# Patient Record
Sex: FEMALE | Race: BLACK OR AFRICAN AMERICAN
[De-identification: names, ages, dates, MRNs, and addresses within clinical notes are randomized per-mention and may not be internally consistent; named-entity substitution may affect disease eponyms.]

---

## 2020-01-25 ENCOUNTER — HOSPITAL ENCOUNTER (EMERGENCY)
Dept: HOSPITAL 84 - D.ER | Age: 65
Discharge: HOME | End: 2020-01-25
Payer: MEDICAID

## 2020-01-25 VITALS — HEIGHT: 62 IN | WEIGHT: 180.38 LBS | BODY MASS INDEX: 33.19 KG/M2

## 2020-01-25 VITALS — SYSTOLIC BLOOD PRESSURE: 127 MMHG | DIASTOLIC BLOOD PRESSURE: 72 MMHG

## 2020-01-25 DIAGNOSIS — G43.909: Primary | ICD-10-CM

## 2020-01-25 DIAGNOSIS — I10: ICD-10-CM

## 2020-01-28 ENCOUNTER — HOSPITAL ENCOUNTER (OUTPATIENT)
Dept: HOSPITAL 84 - D.M2 | Age: 65
Setting detail: OBSERVATION
LOS: 1 days | Discharge: HOME | End: 2020-01-29
Attending: INTERNAL MEDICINE | Admitting: INTERNAL MEDICINE
Payer: COMMERCIAL

## 2020-01-28 VITALS — SYSTOLIC BLOOD PRESSURE: 127 MMHG | DIASTOLIC BLOOD PRESSURE: 61 MMHG

## 2020-01-28 VITALS — HEIGHT: 62 IN | BODY MASS INDEX: 34.14 KG/M2 | WEIGHT: 185.49 LBS

## 2020-01-28 VITALS — DIASTOLIC BLOOD PRESSURE: 47 MMHG | SYSTOLIC BLOOD PRESSURE: 101 MMHG

## 2020-01-28 DIAGNOSIS — R07.89: Primary | ICD-10-CM

## 2020-01-28 DIAGNOSIS — R06.02: ICD-10-CM

## 2020-01-28 DIAGNOSIS — I10: ICD-10-CM

## 2020-01-28 LAB
ALBUMIN SERPL-MCNC: 3.2 G/DL (ref 3.4–5)
ALP SERPL-CCNC: 71 U/L (ref 30–120)
ALT SERPL-CCNC: 49 U/L (ref 10–68)
ANION GAP SERPL CALC-SCNC: 8.3 MMOL/L (ref 8–16)
APTT BLD: 21.8 SECONDS (ref 22.8–39.4)
BASOPHILS NFR BLD AUTO: 0.6 % (ref 0–2)
BILIRUB SERPL-MCNC: 0.27 MG/DL (ref 0.2–1.3)
BUN SERPL-MCNC: 39 MG/DL (ref 7–18)
CALCIUM SERPL-MCNC: 8.9 MG/DL (ref 8.5–10.1)
CHLORIDE SERPL-SCNC: 102 MMOL/L (ref 98–107)
CK MB SERPL-MCNC: 1 U/L (ref 0–3.6)
CK MB SERPL-MCNC: 1.6 U/L (ref 0–3.6)
CK SERPL-CCNC: 169 UL (ref 21–215)
CK SERPL-CCNC: 198 UL (ref 21–215)
CO2 SERPL-SCNC: 31.7 MMOL/L (ref 21–32)
CREAT SERPL-MCNC: 2 MG/DL (ref 0.6–1.3)
EOSINOPHIL NFR BLD: 0.6 % (ref 0–7)
ERYTHROCYTE [DISTWIDTH] IN BLOOD BY AUTOMATED COUNT: 14 % (ref 11.5–14.5)
GLOBULIN SER-MCNC: 4 G/L
GLUCOSE SERPL-MCNC: 98 MG/DL (ref 74–106)
HCT VFR BLD CALC: 37.9 % (ref 36–48)
HGB BLD-MCNC: 12.2 G/DL (ref 12–16)
IMM GRANULOCYTES NFR BLD: 0.3 % (ref 0–5)
INR PPP: 0.95 (ref 0.85–1.17)
LYMPHOCYTES NFR BLD AUTO: 24.7 % (ref 15–50)
MAGNESIUM SERPL-MCNC: 1.9 MG/DL (ref 1.8–2.4)
MCH RBC QN AUTO: 26.2 PG (ref 26–34)
MCHC RBC AUTO-ENTMCNC: 32.2 G/DL (ref 31–37)
MCV RBC: 81.5 FL (ref 80–100)
MONOCYTES NFR BLD: 13 % (ref 2–11)
NEUTROPHILS NFR BLD AUTO: 60.8 % (ref 40–80)
OSMOLALITY SERPL CALC.SUM OF ELEC: 284 MOSM/KG (ref 275–300)
PLATELET # BLD: 352 10X3/UL (ref 130–400)
PMV BLD AUTO: 10.2 FL (ref 7.4–10.4)
POTASSIUM SERPL-SCNC: 4 MMOL/L (ref 3.5–5.1)
PROT SERPL-MCNC: 7.2 G/DL (ref 6.4–8.2)
PROTHROMBIN TIME: 12.6 SECONDS (ref 11.6–15)
RBC # BLD AUTO: 4.65 10X6/UL (ref 4–5.4)
SODIUM SERPL-SCNC: 138 MMOL/L (ref 136–145)
TROPONIN I SERPL-MCNC: < 0.017 NG/ML (ref 0–0.06)
TROPONIN I SERPL-MCNC: < 0.017 NG/ML (ref 0–0.06)
WBC # BLD AUTO: 7.1 10X3/UL (ref 4.8–10.8)

## 2020-01-28 NOTE — NUR
RECEIVED BEDSIDE REPORT. PATIENT IS ALERT AND ORIENTED, RESTING COMFORTABLY IN
BED. RESPIRATIONS ARE EVEN AND UNLABORED. NO S/S OF DISTRESS. NO C/O PAIN.
CALL LIGHT WITHIN REACH. WILL CPOC.

## 2020-01-29 VITALS — DIASTOLIC BLOOD PRESSURE: 87 MMHG | SYSTOLIC BLOOD PRESSURE: 130 MMHG

## 2020-01-29 VITALS — SYSTOLIC BLOOD PRESSURE: 144 MMHG | DIASTOLIC BLOOD PRESSURE: 79 MMHG

## 2020-01-29 VITALS — SYSTOLIC BLOOD PRESSURE: 132 MMHG | DIASTOLIC BLOOD PRESSURE: 70 MMHG

## 2020-01-29 LAB
ALBUMIN SERPL-MCNC: 2.9 G/DL (ref 3.4–5)
ALP SERPL-CCNC: 70 U/L (ref 30–120)
ALT SERPL-CCNC: 45 U/L (ref 10–68)
ANION GAP SERPL CALC-SCNC: 10 MMOL/L (ref 8–16)
BASOPHILS NFR BLD AUTO: 0.5 % (ref 0–2)
BILIRUB SERPL-MCNC: 0.41 MG/DL (ref 0.2–1.3)
BUN SERPL-MCNC: 26 MG/DL (ref 7–18)
CALCIUM SERPL-MCNC: 8.6 MG/DL (ref 8.5–10.1)
CHLORIDE SERPL-SCNC: 100 MMOL/L (ref 98–107)
CK MB SERPL-MCNC: 0.5 U/L (ref 0–3.6)
CK MB SERPL-MCNC: 0.7 U/L (ref 0–3.6)
CK SERPL-CCNC: 114 UL (ref 21–215)
CK SERPL-CCNC: 139 UL (ref 21–215)
CO2 SERPL-SCNC: 30.7 MMOL/L (ref 21–32)
CREAT SERPL-MCNC: 1.5 MG/DL (ref 0.6–1.3)
EOSINOPHIL NFR BLD: 0.3 % (ref 0–7)
ERYTHROCYTE [DISTWIDTH] IN BLOOD BY AUTOMATED COUNT: 13.6 % (ref 11.5–14.5)
GLOBULIN SER-MCNC: 4.2 G/L
GLUCOSE SERPL-MCNC: 108 MG/DL (ref 74–106)
HCT VFR BLD CALC: 37 % (ref 36–48)
HGB BLD-MCNC: 11.9 G/DL (ref 12–16)
IMM GRANULOCYTES NFR BLD: 0.2 % (ref 0–5)
LYMPHOCYTES NFR BLD AUTO: 21.6 % (ref 15–50)
MCH RBC QN AUTO: 25.9 PG (ref 26–34)
MCHC RBC AUTO-ENTMCNC: 32.2 G/DL (ref 31–37)
MCV RBC: 80.6 FL (ref 80–100)
MONOCYTES NFR BLD: 13.8 % (ref 2–11)
NEUTROPHILS NFR BLD AUTO: 63.6 % (ref 40–80)
OSMOLALITY SERPL CALC.SUM OF ELEC: 279 MOSM/KG (ref 275–300)
PLATELET # BLD: 340 10X3/UL (ref 130–400)
PMV BLD AUTO: 10.3 FL (ref 7.4–10.4)
POTASSIUM SERPL-SCNC: 3.7 MMOL/L (ref 3.5–5.1)
PROT SERPL-MCNC: 7.1 G/DL (ref 6.4–8.2)
RBC # BLD AUTO: 4.59 10X6/UL (ref 4–5.4)
SODIUM SERPL-SCNC: 137 MMOL/L (ref 136–145)
TROPONIN I SERPL-MCNC: < 0.017 NG/ML (ref 0–0.06)
TROPONIN I SERPL-MCNC: < 0.017 NG/ML (ref 0–0.06)
WBC # BLD AUTO: 6.3 10X3/UL (ref 4.8–10.8)

## 2020-01-29 NOTE — HP
PATIENT: PING AMADO                                    MEDICAL RECORD: [id number]
ACCOUNT: [id number]                                    LOCATION:[address]
: 51                                            ADMISSION DATE: 20
                                                         PCP: SHEELA REGALADO MD        
 
                             HISTORY AND PHYSICAL EXAMINATION
 
 
DIAGNOSES:
1.  Chest pain compatible with angina.
2.  Shortness of breath.
3.  Hypertension.
 
HISTORY OF PRESENT ILLNESS:  Mrs. Amado has no history of ischemic heart
disease.  She began having chest pain yesterday.  She has had multiple episodes
of recurrent chest pain, it is a very typical anginal pain, a pressure, dull
sensation across the anterior chest, radiating to her left arm and her left jaw.
 She has had multiple episodes of that today.  She could not finish her work due
to increasing severity and increasing episodes of that.  She is currently pain
free.  Her EKG is with no ST-T changes.  She has no history of ischemic heart
disease.
 
PHYSICAL EXAMINATION:
CONSTITUTIONAL/GENERAL APPEARANCE:  Well nourished, well developed, appears
stated age.
EYES:  Lids and conjunctivae noninjected.  No discharge.  No pallor.
ENT:  Lips within normal limit.  No cyanosis. No pallor.
NECK:  Carotid arteries, bilateral normal upstroke.  No bruits. No thrills.  No
jugular venous pressure or distention.
CERVICAL LYMPH NODES:  Nontender.  Nonenlarged.
THYROID:  Not enlarged.  No nodules.
CARDIOVASCULAR:  Precordial exam, nondisplaced.  No heaves or pericardial
thrills.  Rate and rhythm, regular.  Heart sounds, normal S1, normal S2.   No
S3, no gallop, no rub.  Systolic murmur, not heard.  Diastolic murmur, not
heard.
RESPIRATORY:  Respiratory effort, unlabored.   Normal curvature.  No thoracic
deformity.  No chest wall tenderness.  Percussion, resonant.  Auscultation,
clear.  No wheezes, no rales, no rhonchi.
ABDOMEN:  Soft, nondistended, nontender.  No abdominal pain, no vomiting and
normal appetite.
MUSCULOSKELETAL:  No joint tenderness, normal gait, normal tone.
SKIN:  Warm and dry.
 
OVERALL IMPRESSION:  Chest pain compatible with angina, normal EKG, normal
troponin.  We will risk stratify with stress testing Cardiolite imaging. 
Further care depends on findings of stress test.
 
TRANSINT:[id number] Voice Confirmation ID: [id number] DOCUMENT ID: [id number]
 
 
 
 
 
HISTORY AND PHYSICAL                           [id number]    PING AMADO, LATASHA GONZALEZ             
 
 
 
Electronically Signed by LATASHA TINAJERO on 20 at 1228
 
 
 
 
 
 
 
 
 
 
 
 
 
 
 
 
 
 
 
 
 
 
 
 
 
 
 
 
 
 
 
 
 
 
 
 
 
 
 
 
 
CC:                                                             [id number]
DICTATION DATE: 20 1300     :     20 1338      [facility]                                          
[address] [place] [address]

## 2020-01-30 NOTE — ST
PATIENT:PING MORALES                              MEDICAL RECORD: [id number]
SEX: F                                                   LOCATION:[facility]          
ORDER #:                                                 ADMISSION DATE: 01/28/20
AGE OF PATIENT: 64            
 
REFERRING PHYSICIAN:                                                             
 
INTERPRETING PHYSICIAN: LATASHA TINAJERO MD             

 
 
DATE OF SERVICE:  01/29/2020
 
PROCEDURE:  Nuclear stress test.
 
INDICATION:  Chest pain of unknown etiology.
 
She is exercised on standard Lexiscan protocol with 32 mCi of sestamibi injected
at peak stress, 12 mCi used previously for rest images.
 
FINDINGS:  Gated SPECT reveals preserved ejection fraction at 78% with good wall
motion and thickening and brightening throughout all segments.  SPECT imaging: 
Cardiolite was used as myocardial perfusion agent.  There is homogeneous uptake
throughout all segments at rest and stress with no evidence of inducible
ischemia or previous infarction.
 
TRANSINT:[id number] Voice Confirmation ID: [id number] DOCUMENT ID: [id number]
 
 
                                           
                                           LATASHA TINAJERO MD             
 
 
 
Electronically Signed by LATASHA TINAJERO on 01/30/20 at 1254
 
 
 
 
 
 
 
 
 
 
 
 
 
 
 
CC:                                                             [id number]
DICTATION DATE: 01/29/20 1249     :     01/30/20 0754      DEP CLI 
                                                                      01/29/20
[address] [address]

## 2020-01-30 NOTE — EC
PATIENT:PING MORALES                    DATE OF SERVICE: 01/28/20
SEX: F                                  MEDICAL RECORD: [id number]
DATE OF BIRTH: [birth year]                        LOCATION:D.M2           
AGE OF PATIENT: 64                             ADMISSION DATE: 01/28/20
 
REFERRING PHYSICIAN:                               
 
INTERPRETING PHYSICIAN: LATASHA URBANO MD             
 
 
 
                             ECHOCARDIOGRAM REPORT
  ECHO CHARGES 4               ECHO COMPLETE                 Date: 01/28/20
 
 
 
CLINICAL DIAGNOSIS: SOB                           
 
                         ECHOCARDIOGRAPHIC MEASUREMENTS
      (adult normal given)
   AC root (d.<3.7cm) 2.9  cm   LV Septum d (<1.2 cm> 1.5  cm
      Valve Excursion 1.8  cm     LV Septum (systole) 1.9  cm
Left Atria (s.<4.0cm> 3.6  cm          LVPW d(<1.2cm) 1.4  cm
        RV (d.<2.3cm) 2.4  cm           LVPW (sytole) 1.9  cm
  LV diastole(<5.6CM) 5.1  cm       MV E-F(>70mm/sec)      cm
           LV systole 2.7  cm           LVOT Diameter 1.7  cm
       MV exc.(>10mm)      cm
Est.ejection fraction (50-75%)     %
 
   DOPPLER:
     LVIT      cm/sec A 98.0 cm/sec E 65.0  cm/sec
       LA      cm/sec      RVSP 32.0 mmHg
     LVOT 104  cm/sec   AOP1/2T      m/s
  Asc. Ao 163  cm/sec
     RVOT 57.0 cm/sec
       RA      cm/sec
       PA 79.0 cm/sec
 AV Gradient Peak 11.0 mmHg  AV Mean 4.3  mmHg  AV Area 1.5  cm
 MV Gradient Peak 5.8  mmHg  MV Mean 2.0  mmHg  MV Area      cm
   COMMENTS:                                              
 
 
 Cardiac Sonographer: Hoang FRANCISOE            
      Cardiologist: 1          Dr. Urbano                
             TAPE# PACS           
                                       Pericardial Effusion N                        
 
 
DATE OF SERVICE:  01/29/2020
 
FINDINGS:
1.  Left ventricular chamber size is within normal limits.  Left ventricular
systolic function is normal at 55%.
2.  Left atrium, right atrium, and right ventricular chamber sizes are within
normal limits.
3.  Valvular structures have normal structure and motion.
4.  Doppler interrogation reveals trace mitral regurgitation, no other valvular
insufficiency or stenosis and pulmonary systolic pressure is normal estimated at
 
 
 
ECHOCARDIOGRAM REPORT                          [id number]    PING MORALES            
 
 
32 mmHg.
5.  No evidence of pericardial effusion or left ventricular thrombus.
 
TRANSINT:[id number] Voice Confirmation ID: [id number] DOCUMENT ID: [id number]
                                           
                                           LATASHA URBANO MD             
 
 
 
Electronically Signed by LATASHA URBANO on 01/30/20 at 1254
 
 
 
 
 
 
 
 
 
 
 
 
 
 
 
 
 
 
 
 
 
 
 
 
 
 
 
 
 
 
 
 
 
 
 
 
CC:                                                             [id number]
DICTATION DATE: 01/29/20 1501     :     01/29/20 2248      DEP CLI 
                                                                      01/29/20
[place] [phone]

## 2020-01-30 NOTE — DS
PATIENT:PING MORALES                      :51   MEDICAL RECORD: [id number]
 
                              DISCHARGE SUMMARY
                                                         
ADMISSION DATE:    20                       DISCHARGE DATE:     20
 
 
DIAGNOSES:
1.  Chest pain.
2.  Normal nuclear stress test.
 
HISTORY AND HOSPITAL COURSE:  The patient presents with chest pain and shortness
of breath; however, nuclear stress testing was absolutely normal.  Chest pain is
most likely musculoskeletal in nature.  No other cardiac workup or testing is
necessary.
 
TRANSINT:[id number] Voice Confirmation ID: [id number] DOCUMENT ID: [id number]
                                           
                                           LATASHA TINAJERO MD             
 
 
 
Electronically Signed by LATASHA TINAJERO on 20 at 1254
 
 
 
 
 
 
 
 
 
 
 
 
 
 
 
 
 
 
 
 
 
 
 
 
 
 
 
CC:                                                             [id number]
DICTATION DATE: 20 1229     :     20 0749      [facility] CLI 
                                                                      20
[address] [address]

## 2020-07-14 ENCOUNTER — HOSPITAL ENCOUNTER (OUTPATIENT)
Dept: HOSPITAL 84 - D.MRI | Age: 65
Discharge: HOME | End: 2020-07-14
Attending: ORTHOPAEDIC SURGERY
Payer: COMMERCIAL

## 2020-07-14 VITALS — BODY MASS INDEX: 33.9 KG/M2

## 2020-07-14 DIAGNOSIS — M75.01: Primary | ICD-10-CM
